# Patient Record
Sex: FEMALE | Race: WHITE
[De-identification: names, ages, dates, MRNs, and addresses within clinical notes are randomized per-mention and may not be internally consistent; named-entity substitution may affect disease eponyms.]

---

## 2017-02-23 NOTE — ED.PDOC
General


ED Provider: 


Dr. KAREN ZAVALA JR





Chief Complaint: Respiratory Complaint


Stated Complaint: fever off and on--coughing freq--coughing so hard that causes 

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 

yellow mucous with cough--sore throat[End]101.3 119 20 96% 126/76 2WEEKS


Time Seen by Physician: 08:39


Mode of Arrival: Walk-In


Information Source: Patient


Exam Limitations: No limitations


Primary Care Provider: 


STEPHANIE ARANA





Nursing and Triage Documentation Reviewed and Agree: No





Review of Systems





- Review Of Systems


Constitutional: Reports: Fever, Malaise, Weakness


Eyes: Reports: No symptoms


Ears, Nose, Mouth, Throat: Reports: Ear pain, Nose discharge, Throat pain


Respiratory: Reports: Cough, Short of air


Cardiac: Reports: No symptoms


GI: Reports: No symptoms


: Reports: No symptoms


Musculoskeletal: Reports: No symptoms


Skin: Reports: No symptoms


Neurological: Reports: No symptoms


Endocrine: Reports: No symptoms


Hematologic/Lymphatic: Reports: No symptoms


All Other Systems: Other





Past Medical History





- Past Medical History


Previously Healthy: Yes


Endocrine: Reports: None


Cardiovascular: Reports: None


Respiratory: Reports: None, Other (om)


Hematological: Reports: None


Gastrointestinal: Reports: None


Genitourinary: Reports: None, Other (-recent miscarriage)


Neuro/Psych: Reports: None


Musculoskeletal: Reports: None


Cancer: Reports: None


Last Menstrual Period: 2-weeks ago





- Surgical History


General Surgical History: Reports: Appendectomy, Cholecystectomy, Other (PET)





- Family History


Family History: Reports: Unknown (no one else ill)





- Social History


Smoking Status: Current some day smoker


Hx Substance Use: No


Alcohol Screening: None





Physical Exam





- Physical Exam


Appearance: Ill-appearing, Obese


Pain Distress: Moderate


Eyes: CINTHYA, EOMI, Conjunctiva clear


ENT: Erythema (TMS RED LEFT>RIGHT RIGHT EAR PAIN)


Neck: Supple


Respiratory: Airway patent, Breath sounds equal, Rhonchi


Cardiovascular: RRR, Pulses normal, No rub, No murmur


GI/: Soft, Nontender, No masses, Bowel sounds normal, No Organomegaly


Musculoskeletal: Normal strength, ROM intact, No edema, No calf tenderness


Skin: Warm, Dry, Normal color


Neurological: Sensation intact, Motor intact, Reflexes intact, Cranial nerves 

intact, Alert, Oriented


Psychiatric: Affect appropriate, Mood appropriate





Critical Care Note





- Critical Care Note


Total Time (mins): 0





Course





- Course


Orders, Labs, Meds: 


Orders











 Category Date Time Status


 


 MOLECULAR GROUP A STREP Stat LAB  02/23/17 08:55 Results


 


 RAPID FLU A/B Stat LAB  02/23/17 08:45 Received


 


 STREP SCREEN Stat LAB  02/23/17 08:55 Results











Vital Signs: 


 











  Temp Pulse Resp BP Pulse Ox


 


 02/23/17 08:29  101.3 F H  119 H  20  126/76  96














Departure





- Departure


Time of Disposition: 08:52


Disposition: HOME SELF-CARE


Discharge Problem: 


 URTI (acute upper respiratory infection), Otitis media





Instructions:  Otitis Media (ED), Upper Respiratory Infection (ED), How to Stop 

Smoking (ED)


Condition: Good


Pt referred to PMD for follow-up: Yes


Additional Instructions: 


RECHECK ONE WEEK IF NOT RESOLVED


FLU AND STREP ARE NEGATIVE


ANTIHISTAMINE DECONGESTANT(ANY ) FOR CONGESTION


MAY BEGIN STEROID FOR INFLAMMATION(MEDROL DOSEPACK)


MOTRIN FOR PAIN


AMOXIL FOR INFECTION


AVOID CHILDREN UNTIL NO FEVER


Prescriptions: 


Amoxicillin [Amoxil] 1,000 mg PO BID #14 capsule


Guaifenesin/Codeine Phosphate [Robitussin AC Syrup] 10 ml PO Q6H PRN #240 ml


 PRN Reason: Cough


Loratadine/Pseudoephedrine [Claritin-D 12 Hour Tablet] 1 each PO BID PRN #60 

tab.er.12h


 PRN Reason: Allergy Symptoms


Methylprednisolone [Medrol Dosepak] 4 mg PO AS DIRECTED #1 pkg


Allergies/Adverse Reactions: 


Allergies





aloe Adverse Reaction (Verified 02/23/17 08:37)


 








Home Medications: 


Ambulatory Orders





Amoxicillin [Amoxil] 1,000 mg PO BID #14 capsule 02/23/17 


Birth Control 1 tab PO DAILY 02/23/17 


Guaifenesin/Codeine Phosphate [Robitussin AC Syrup] 10 ml PO Q6H PRN #240 ml 02/ 23/17 


Loratadine/Pseudoephedrine [Claritin-D 12 Hour Tablet] 1 each PO BID PRN #60 

tab.er.12h 02/23/17 


Methylprednisolone [Medrol Dosepak] 4 mg PO AS DIRECTED #1 pkg 02/23/17

## 2018-01-25 ENCOUNTER — HOSPITAL ENCOUNTER (EMERGENCY)
Dept: HOSPITAL 58 - ED | Age: 23
Discharge: HOME | End: 2018-01-25

## 2018-01-25 VITALS — DIASTOLIC BLOOD PRESSURE: 84 MMHG | TEMPERATURE: 98.7 F | SYSTOLIC BLOOD PRESSURE: 140 MMHG

## 2018-01-25 VITALS — BODY MASS INDEX: 31.7 KG/M2
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
## 2018-01-25 DIAGNOSIS — Z33.1: ICD-10-CM
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
## 2018-01-25 DIAGNOSIS — S61.432A: Primary | ICD-10-CM

## 2018-01-25 PROCEDURE — 86706 HEP B SURFACE ANTIBODY: CPT

## 2018-01-25 PROCEDURE — 87522 HEPATITIS C REVRS TRNSCRPJ: CPT

## 2018-01-25 PROCEDURE — 80076 HEPATIC FUNCTION PANEL: CPT

## 2018-01-25 PROCEDURE — 86803 HEPATITIS C AB TEST: CPT

## 2018-01-25 PROCEDURE — 99283 EMERGENCY DEPT VISIT LOW MDM: CPT

## 2018-01-25 PROCEDURE — 36415 COLL VENOUS BLD VENIPUNCTURE: CPT

## 2018-01-25 PROCEDURE — 86592 SYPHILIS TEST NON-TREP QUAL: CPT

## 2018-01-25 PROCEDURE — 87340 HEPATITIS B SURFACE AG IA: CPT

## 2018-01-25 NOTE — ED.PDOC
General


ED Provider: 


Dr. PANFILO ZAVALA





Chief Complaint: Needlestick


Stated Complaint: CC: Needle Stick.  HPI:PATIENT STATES THAT SHE IS A 

 AT [employer]. PATIENT STATES THAT SHE WAS TUCKING THE SHEETS 

BETWEEN THE MATRESS AND THE BOX SPRINGS AND WAS STUCK BY A NEEDLE ON WHAT THE 

PATIENT REPORTED TO BE AN INSULIN SYRINGE. PATIENT STATES THAT THE SYRINGE WAS 

EMPTY THAT SHE NOTICED. PATIENT IS ALSO APPROXIMATELY 20WKS PREGNANT AND SEES 

DR. ROBERTSON AT [facility]


Time Seen by Physician: 15:40


Mode of Arrival: Walk-In


Information Source: Patient


Primary Care Provider: 


STEPHANIE ARANA





Nursing and Triage Documentation Reviewed and Agree: Yes


Reviewed sepsis parameters & appropriate labs ordered?: Yes


System Inflammatory Response Syndrome: Not Applicable


Sepsis Protocol: 


For patient's 13 years and over:





Temp is 96.8 and below  and greater


Pulse >90 BPM


Resp >20/minute


Acutely Altered Mental Status





Are patient's symptoms suggestive of a new infection, such as:


   -Pneumonia


   -Skin, Soft Tissue


   -Endocarditis


   -UTI


   -Bone, Joint Infection


   -Implantable Device


   -Acute Abdominal Infection


   -Wound Infection


   -Meningitis


   -Blood Stream Catheter Infection


   -Unknown





System Inflammatory Response Syndrome: Not Applicable





Environmental Complaint Exam





- Nuclear/Biologic/Chemical Complaint/Exam


Patient Complains of: Reports: Biological Exposure


Exposure Happened: Reports: Seconds


Initial Severity: Mild


Current Severity: Mild


Description of Incident: Patient states was stuck by needle when she was 

placing hand inbet mattress


Type of Exposure: Reports: Open


Length of Exposure: Seconds


Associated Signs and Symptoms: None


Decontamination: No


Initial Findings: Present: Tenderness, Abrasion (Puncture wound between 2nd and 

3rd mid Rt Metartasal region)





Review of Systems





- Review Of Systems


Constitutional: Reports: No symptoms


Eyes: Reports: No symptoms


Ears, Nose, Mouth, Throat: Reports: No symptoms


Respiratory: Reports: No symptoms


Cardiac: Reports: No symptoms


GI: Reports: No symptoms


: Reports: No symptoms


Musculoskeletal: Reports: No symptoms, Other (See CC HPI)


Skin: Reports: Other (Puncture wound dorsum Lt Hand)


Neurological: Reports: No symptoms


Endocrine: Reports: No symptoms


Hematologic/Lymphatic: Reports: No symptoms


All Other Systems: Reviewed and Negative





Past Medical History





- Past Medical History


Previously Healthy: Yes


Endocrine: Reports: None


Cardiovascular: Reports: None


Respiratory: Reports: None, Other (om)


Hematological: Reports: None


Gastrointestinal: Reports: None


Genitourinary: Reports: None, Other (-recent miscarriage)


Neuro/Psych: Reports: None


Musculoskeletal: Reports: None


Cancer: Reports: None


Last Menstrual Period: PREGNANT





- Surgical History


General Surgical History: Reports: Appendectomy, Cholecystectomy, Other (PET)





- Family History


Family History: Reports: Unknown (no one else ill)





- Social History


Smoking Status: Former smoker


Hx Substance Use: No


Alcohol Screening: None





- Immunizations


Tetanus Shot up to Date: No





Physical Exam





- Physical Exam


Appearance: Well-appearing


Ill-appearing: None


Pain Distress: None


Eyes: CINTHYA, EOMI, Conjunctiva clear


ENT: Ears normal, Nose normal


Respiratory: Airway patent, Breath sounds clear


Cardiovascular: RRR, Pulses normal


GI/: Soft, Nontender, No masses


Musculoskeletal: Normal strength


Skin: Warm, Dry, Normal color, Pale (dorsum Lt Hand middle aspect beween 2nd 

and 3rd metacarpal)


Neurological: Sensation intact, Motor intact, Reflexes intact


Psychiatric: Affect appropriate, Mood appropriate





Re-Evaluation





- Re-Evaluation


Time of Re-Evaluation: 17:30


Status: Unchanged


Vital Signs Stable: Yes


Appearance: NAD


Lungs: Clear


Skin: Warm and Dry


Neuro: Alert and Oriented X3


CV: RRR





- Re-Evaluation


Time of Re-Evaluation: 18:10


Additional Comments: Discussed case with [facility] regarding PEP treatment 

for HIV.see note





Critical Care Note





- Critical Care Note


Total Time (mins): 0





Course





- Course


Orders, Labs, Meds: 


Lab Review











  01/25/18





  16:40


 


Total Bilirubin  < 0.3


 


Direct Bilirubin  0.13


 


AST  15


 


ALT  20


 


Alkaline Phosphatase  62


 


Total Protein  6.5


 


Albumin  2.9 L








Orders











 Category Date Time Status


 


 HCV ANTIBODY Stat LAB  01/25/18 16:40 Received


 


 HCV RNA BY PCR Stat LAB  01/25/18 16:40 Received


 


 HEPATIC PANEL Stat LAB  01/25/18 16:40 Completed


 


 HEPATITIS B SURFACE ANTIBODY Stat LAB  01/25/18 16:40 Received


 


 HEPATITIS B SURFACE ANTIGEN Stat LAB  01/25/18 16:40 Received


 


 RAPID PLASMA REAGIN Stat LAB  01/25/18 16:40 Received











Vital Signs: 


 











  Temp Pulse Resp BP Pulse Ox


 


 01/25/18 15:29  98.7 F  89  20  140/84  98














Departure





- Departure


Time of Disposition: 18:20


Disposition: HOME SELF-CARE


Discharge Problem: 


 Needle stick injury of finger of left hand





Instructions:  Needle Stick Injuries (ED)


Condition: Good


Pt referred to PMD for follow-up: Yes (See OB GYN or PCP in next week)


IPMP verified?: No


Additional Instructions: 


Have HIV testing repeated in 4-6 weeks then at 6 months


Have Follow up HCV(hep c) testing in 6 mo


Call for results of testing completed here for results or go to PCP /OB 

physician to get results.


Determine Hep B immunity status


Wound Cart for Lt hand


Allergies/Adverse Reactions: 


Allergies





aloe Adverse Reaction (Verified 01/25/18 15:36)


 








Home Medications: 


Ambulatory Orders





Prenatal Vit No.129/Iron/Folic [Prenatal One Daily Tablet] 1 tab PO DAILY 01/25/ 18 











Additional Comments


Additional Comments: Discussed case with staff member at [facility] 

regarding PEP HIV treatment due to concern over her needle stick. Patient 

states needle appeared Dry.See Additional documentation on dictation

## 2018-01-28 ENCOUNTER — HOSPITAL ENCOUNTER (OUTPATIENT)
Dept: HOSPITAL 58 - LAB | Age: 23
Discharge: HOME | End: 2018-01-28
Attending: FAMILY MEDICINE

## 2018-01-28 VITALS — BODY MASS INDEX: 31.7 KG/M2
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
## 2018-01-28 DIAGNOSIS — T14.8XXA: Primary | ICD-10-CM
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
## 2018-01-28 PROCEDURE — 36415 COLL VENOUS BLD VENIPUNCTURE: CPT

## 2018-01-28 PROCEDURE — 86701 HIV-1ANTIBODY: CPT
